# Patient Record
Sex: FEMALE | Race: WHITE | Employment: UNEMPLOYED | URBAN - METROPOLITAN AREA
[De-identification: names, ages, dates, MRNs, and addresses within clinical notes are randomized per-mention and may not be internally consistent; named-entity substitution may affect disease eponyms.]

---

## 2017-09-15 PROBLEM — Z22.330 GBS CARRIER: Status: ACTIVE | Noted: 2017-09-15

## 2017-09-15 PROBLEM — Z3A.41 41 WEEKS GESTATION OF PREGNANCY: Status: ACTIVE | Noted: 2017-09-15

## 2017-09-15 PROBLEM — O48.0 41 WEEKS GESTATION OF PREGNANCY: Status: ACTIVE | Noted: 2017-09-15

## 2017-09-19 ENCOUNTER — HOSPITAL ENCOUNTER (INPATIENT)
Age: 22
LOS: 3 days | Discharge: HOME | End: 2017-09-22
Attending: OBSTETRICS & GYNECOLOGY | Admitting: OBSTETRICS & GYNECOLOGY
Payer: COMMERCIAL

## 2017-09-19 PROBLEM — O48.0 POST TERM PREGNANCY AT 41 WEEKS GESTATION: Status: ACTIVE | Noted: 2017-09-19

## 2017-09-19 PROBLEM — O99.013 ANEMIA DURING PREGNANCY IN THIRD TRIMESTER: Status: ACTIVE | Noted: 2017-09-19

## 2017-09-19 PROBLEM — Z3A.41 POST TERM PREGNANCY AT 41 WEEKS GESTATION: Status: ACTIVE | Noted: 2017-09-19

## 2017-09-19 LAB
ABO + RH BLD: NORMAL
BASOPHILS # BLD: 0 K/UL (ref 0–0.06)
BASOPHILS NFR BLD: 0 % (ref 0–2)
BLOOD GROUP ANTIBODIES SERPL: NORMAL
DIFFERENTIAL METHOD BLD: ABNORMAL
EOSINOPHIL # BLD: 0.1 K/UL (ref 0–0.4)
EOSINOPHIL NFR BLD: 1 % (ref 0–5)
ERYTHROCYTE [DISTWIDTH] IN BLOOD BY AUTOMATED COUNT: 14.8 % (ref 11.6–14.5)
HCT VFR BLD AUTO: 30.2 % (ref 35–45)
HGB BLD-MCNC: 9.9 G/DL (ref 12–16)
LYMPHOCYTES # BLD: 1.8 K/UL (ref 0.9–3.6)
LYMPHOCYTES NFR BLD: 21 % (ref 21–52)
MCH RBC QN AUTO: 29.2 PG (ref 24–34)
MCHC RBC AUTO-ENTMCNC: 32.8 G/DL (ref 31–37)
MCV RBC AUTO: 89.1 FL (ref 74–97)
MONOCYTES # BLD: 0.6 K/UL (ref 0.05–1.2)
MONOCYTES NFR BLD: 7 % (ref 3–10)
NEUTS SEG # BLD: 6.1 K/UL (ref 1.8–8)
NEUTS SEG NFR BLD: 71 % (ref 40–73)
PLATELET # BLD AUTO: 172 K/UL (ref 135–420)
PMV BLD AUTO: 10.6 FL (ref 9.2–11.8)
RBC # BLD AUTO: 3.39 M/UL (ref 4.2–5.3)
SPECIMEN EXP DATE BLD: NORMAL
WBC # BLD AUTO: 8.6 K/UL (ref 4.6–13.2)

## 2017-09-19 PROCEDURE — 3E033VJ INTRODUCTION OF OTHER HORMONE INTO PERIPHERAL VEIN, PERCUTANEOUS APPROACH: ICD-10-PCS | Performed by: OBSTETRICS & GYNECOLOGY

## 2017-09-19 PROCEDURE — 86900 BLOOD TYPING SEROLOGIC ABO: CPT | Performed by: OBSTETRICS & GYNECOLOGY

## 2017-09-19 PROCEDURE — 36415 COLL VENOUS BLD VENIPUNCTURE: CPT | Performed by: OBSTETRICS & GYNECOLOGY

## 2017-09-19 PROCEDURE — 74011250637 HC RX REV CODE- 250/637: Performed by: OBSTETRICS & GYNECOLOGY

## 2017-09-19 PROCEDURE — 74011250636 HC RX REV CODE- 250/636

## 2017-09-19 PROCEDURE — 74011250636 HC RX REV CODE- 250/636: Performed by: OBSTETRICS & GYNECOLOGY

## 2017-09-19 PROCEDURE — 4A0HXCZ MEASUREMENT OF PRODUCTS OF CONCEPTION, CARDIAC RATE, EXTERNAL APPROACH: ICD-10-PCS | Performed by: OBSTETRICS & GYNECOLOGY

## 2017-09-19 PROCEDURE — 85025 COMPLETE CBC W/AUTO DIFF WBC: CPT | Performed by: OBSTETRICS & GYNECOLOGY

## 2017-09-19 PROCEDURE — 75410000002 HC LABOR FEE PER 1 HR

## 2017-09-19 PROCEDURE — 74011000258 HC RX REV CODE- 258

## 2017-09-19 PROCEDURE — 10907ZC DRAINAGE OF AMNIOTIC FLUID, THERAPEUTIC FROM PRODUCTS OF CONCEPTION, VIA NATURAL OR ARTIFICIAL OPENING: ICD-10-PCS | Performed by: OBSTETRICS & GYNECOLOGY

## 2017-09-19 PROCEDURE — 65270000029 HC RM PRIVATE

## 2017-09-19 RX ORDER — SODIUM CHLORIDE 900 MG/100ML
INJECTION INTRAVENOUS
Status: COMPLETED
Start: 2017-09-19 | End: 2017-09-19

## 2017-09-19 RX ORDER — TERBUTALINE SULFATE 1 MG/ML
0.25 INJECTION SUBCUTANEOUS
Status: DISCONTINUED | OUTPATIENT
Start: 2017-09-19 | End: 2017-09-21 | Stop reason: HOSPADM

## 2017-09-19 RX ORDER — NALBUPHINE HYDROCHLORIDE 10 MG/ML
10 INJECTION, SOLUTION INTRAMUSCULAR; INTRAVENOUS; SUBCUTANEOUS
Status: DISCONTINUED | OUTPATIENT
Start: 2017-09-19 | End: 2017-09-21 | Stop reason: HOSPADM

## 2017-09-19 RX ORDER — OXYTOCIN IN 5 % DEXTROSE 30/500 ML
1-25 PLASTIC BAG, INJECTION (ML) INTRAVENOUS
Status: DISCONTINUED | OUTPATIENT
Start: 2017-09-19 | End: 2017-09-21 | Stop reason: HOSPADM

## 2017-09-19 RX ORDER — PENICILLIN G POTASSIUM 5000000 [IU]/1
2.5 INJECTION, POWDER, FOR SOLUTION INTRAMUSCULAR; INTRAVENOUS EVERY 4 HOURS
Status: DISCONTINUED | OUTPATIENT
Start: 2017-09-19 | End: 2017-09-21 | Stop reason: HOSPADM

## 2017-09-19 RX ORDER — ACETAMINOPHEN 325 MG/1
650 TABLET ORAL
Status: DISCONTINUED | OUTPATIENT
Start: 2017-09-19 | End: 2017-09-22 | Stop reason: HOSPADM

## 2017-09-19 RX ORDER — PENICILLIN G POTASSIUM 5000000 [IU]/1
5 INJECTION, POWDER, FOR SOLUTION INTRAMUSCULAR; INTRAVENOUS EVERY 4 HOURS
Status: DISCONTINUED | OUTPATIENT
Start: 2017-09-19 | End: 2017-09-19

## 2017-09-19 RX ORDER — PENICILLIN G POTASSIUM 5000000 [IU]/1
2.5 INJECTION, POWDER, FOR SOLUTION INTRAMUSCULAR; INTRAVENOUS EVERY 4 HOURS
Status: DISCONTINUED | OUTPATIENT
Start: 2017-09-19 | End: 2017-09-19 | Stop reason: SDUPTHER

## 2017-09-19 RX ORDER — BUTORPHANOL TARTRATE 2 MG/ML
2 INJECTION INTRAMUSCULAR; INTRAVENOUS
Status: DISCONTINUED | OUTPATIENT
Start: 2017-09-19 | End: 2017-09-21 | Stop reason: HOSPADM

## 2017-09-19 RX ORDER — OXYTOCIN/RINGER'S LACTATE 20/1000 ML
500 PLASTIC BAG, INJECTION (ML) INTRAVENOUS ONCE
Status: ACTIVE | OUTPATIENT
Start: 2017-09-19 | End: 2017-09-19

## 2017-09-19 RX ORDER — MINERAL OIL
30 OIL (ML) ORAL AS NEEDED
Status: COMPLETED | OUTPATIENT
Start: 2017-09-19 | End: 2017-09-20

## 2017-09-19 RX ORDER — ZOLPIDEM TARTRATE 5 MG/1
10 TABLET ORAL
Status: DISCONTINUED | OUTPATIENT
Start: 2017-09-19 | End: 2017-09-22 | Stop reason: HOSPADM

## 2017-09-19 RX ORDER — LIDOCAINE HYDROCHLORIDE 10 MG/ML
20 INJECTION, SOLUTION EPIDURAL; INFILTRATION; INTRACAUDAL; PERINEURAL AS NEEDED
Status: DISCONTINUED | OUTPATIENT
Start: 2017-09-19 | End: 2017-09-21 | Stop reason: HOSPADM

## 2017-09-19 RX ORDER — SODIUM CHLORIDE, SODIUM LACTATE, POTASSIUM CHLORIDE, CALCIUM CHLORIDE 600; 310; 30; 20 MG/100ML; MG/100ML; MG/100ML; MG/100ML
125 INJECTION, SOLUTION INTRAVENOUS CONTINUOUS
Status: DISCONTINUED | OUTPATIENT
Start: 2017-09-19 | End: 2017-09-21 | Stop reason: HOSPADM

## 2017-09-19 RX ORDER — OXYTOCIN/RINGER'S LACTATE 20/1000 ML
125 PLASTIC BAG, INJECTION (ML) INTRAVENOUS CONTINUOUS
Status: DISCONTINUED | OUTPATIENT
Start: 2017-09-19 | End: 2017-09-21 | Stop reason: HOSPADM

## 2017-09-19 RX ORDER — METHYLERGONOVINE MALEATE 0.2 MG/ML
0.2 INJECTION INTRAVENOUS AS NEEDED
Status: DISCONTINUED | OUTPATIENT
Start: 2017-09-19 | End: 2017-09-21 | Stop reason: HOSPADM

## 2017-09-19 RX ORDER — HYDROMORPHONE HYDROCHLORIDE 2 MG/ML
1 INJECTION, SOLUTION INTRAMUSCULAR; INTRAVENOUS; SUBCUTANEOUS
Status: DISCONTINUED | OUTPATIENT
Start: 2017-09-19 | End: 2017-09-21 | Stop reason: HOSPADM

## 2017-09-19 RX ADMIN — PENICILLIN G POTASSIUM 2.5 MILLION UNITS: 20000000 POWDER, FOR SOLUTION INTRAVENOUS at 19:39

## 2017-09-19 RX ADMIN — PENICILLIN G POTASSIUM 2.5 MILLION UNITS: 20000000 POWDER, FOR SOLUTION INTRAVENOUS at 13:19

## 2017-09-19 RX ADMIN — PENICILLIN G POTASSIUM 5 MILLION UNITS: 5000000 POWDER, FOR SOLUTION INTRAMUSCULAR; INTRAPLEURAL; INTRATHECAL; INTRAVENOUS at 08:20

## 2017-09-19 RX ADMIN — ACETAMINOPHEN 650 MG: 325 TABLET ORAL at 13:31

## 2017-09-19 RX ADMIN — SODIUM CHLORIDE, SODIUM LACTATE, POTASSIUM CHLORIDE, AND CALCIUM CHLORIDE 125 ML/HR: 600; 310; 30; 20 INJECTION, SOLUTION INTRAVENOUS at 18:13

## 2017-09-19 RX ADMIN — Medication 2 MILLI-UNITS/MIN: at 08:16

## 2017-09-19 RX ADMIN — Medication 20 MILLI-UNITS/MIN: at 15:02

## 2017-09-19 RX ADMIN — SODIUM CHLORIDE 100 ML: 900 INJECTION INTRAVENOUS at 08:20

## 2017-09-19 NOTE — PROGRESS NOTES
0800 Consents signed. Head to toe assessment complete. Patient resting comfortably in high fowlers with right pelvic hip tilt. Denies needs. 925 [facility] Dr. Jose Hebert at bedside. SVE 1/80/-2.; discussing plan for the day. 1025 Patient OOB to void   Patient placed on birthing ball. EFM/TOCO adjusted. Denies needs. [address] on birthing ball with family at bedside. Denies needs. 1154 Patient OOB to void w/o difficulty. 1205 Back to bed. Positioned left lateral with peanut ball. EFM/TOCO adjusted. Denies needs. 1250 Patient complaints of dull anterior headache and requests medicine to alleviate. Vitals WNL; denies headache, blurry vision. 2729 [facility] [address] [facility] [address] Dr. Jose Hebert with quick return call. Orders received for PO Tylenol 650 mg q 4-6 prn.  1333 Patient OOB to void. 1339 Back to bed; high gli's with heels touch. EFM/TOCO adjusted. 1414 Patient OOB to void. 1420 Patient positioned hands and knees. 1503 Patient high-gil's with heels together; EFM/TOCO adjusted. Denies needs. 1613 Patient OOB to void.

## 2017-09-19 NOTE — PROGRESS NOTES
2979 - Patient taken to BR 6, gowned and assisted to bed. Oriented to room, call light within reach. 0700 - EFM tested, positive FM.     0705 - MARY ELLEN Carter RN assumed care.

## 2017-09-19 NOTE — IP AVS SNAPSHOT
[facility] 
 
 
 [address] [facility] [address] 
[phone] Patient: Hannah Alex MRN: [id number] :1995 Current Discharge Medication List  
  
START taking these medications Dose & Instructions Dispensing Information Comments Morning Noon Evening Bedtime  
 ibuprofen 800 mg tablet Commonly known as:  MOTRIN Your last dose was: Your next dose is:    
   
   
 Dose:  800 mg Take 1 Tab by mouth every eight (8) hours as needed. Indications: Pain Quantity:  40 Tab Refills:  1 CONTINUE these medications which have NOT CHANGED Dose & Instructions Dispensing Information Comments Morning Noon Evening Bedtime PNV66-Iron Fumarate-FA-DSS-DHA 26-1.2- mg Cap Your last dose was: Your next dose is: Take  by mouth. Refills:  0 Where to Get Your Medications These medications were sent to [facility] [place] - [address]  [address], [place] [address] Hours:  M-F 930am-6pm Phone:  [phone]  
  ibuprofen 800 mg tablet

## 2017-09-19 NOTE — H&P
History & Physical    Name: Murali Tierney MRN: [id number]  SSN: [id number]    YOB: 1995  Age: 20 y.o. Sex: female      Subjective:     Estimated Date of Delivery: 17  OB History    Para Term  AB Living   2 0   1 0   SAB TAB Ectopic Molar Multiple Live Births    1    0      # Outcome Date GA Lbr Bryan/2nd Weight Sex Delivery Anes PTL Lv   2 Current            1 TAB                   Ms. Bassam Montelongo is admitted with pregnancy at [address] [place] [address] for induction of labor due to poor fetal growth and post dates. Prenatal course was complicated by SGA fetus. Please see prenatal records for details. Last EFW 6 lb. 15 oz. 10%. Patient denies HA, swelling, RUQ pain. No past medical history on file. Past Surgical History:   Procedure Laterality Date    HX WISDOM TEETH EXTRACTION       Social History     Occupational History    Not on file. Social History Main Topics    Smoking status: Former Smoker     Packs/day: 0.25    Smokeless tobacco: Never Used    Alcohol use No    Drug use: No    Sexual activity: Yes     Family History   Problem Relation Age of Onset    Diabetes Mother     Heart Disease Father     Hypertension Father        No Known Allergies  Prior to Admission medications    Medication Sig Start Date End Date Taking? Authorizing Provider   PNV66-Iron Fumarate-FA-DSS-DHA 26-1.2- mg cap Take  by mouth. Historical Provider        Review of Systems: Pertinent items are noted in the History of Present Illness. Objective:     Vitals:  Vitals:    17 0801 17 0831 17 0901 17 0931   BP: 119/79 111/80 106/73 108/71   Pulse: 81 84 83 86   Resp: 18      Temp: 98.4 °F (36.9 °C)      Weight:   200 lb (90.7 kg)    Height:   5' 3\" (1.6 m)         Physical Exam:  Patient without distress.   Abdomen:  Gravid, vertex, EFW 7 lbs  Pelvic:  1/80/-2/mid/soft  Membranes:  Intact  Fetal Heart Rate: Reactive          Prenatal Labs:   Lab Results   Component Value Date/Time ABO/Rh(D) O POSITIVE 09/19/2017 07:30 AM    Rubella, External 2.28 02/03/2017    HBsAg, External NON-REACTIVE 02/03/2017    HIV, External NON-REACTIVE 02/03/2017    RPR, External NON- REACTIVE 02/03/2017    Gonorrhea, External NEGATIVE 02/03/2017    Chlamydia, External NEGATIVE 02/03/2017    GrBStrep, External positive 08/16/2017       Impression/Plan:     Post dates. SGA.  +GBS carrier. Plan: Admit for induction of labor. Group B Strep positive, will treat prophylactically with penicillin.     Signed By:  Linda Navarro MD     September 19, 2017

## 2017-09-19 NOTE — PROGRESS NOTES
0710 Bedside and Verbal shift change report given to MARY ELLEN Cook,Sadaf and TU Woods RN (oncoming nurse) by HUSSAIN Jeff Rn (offgoing nurse). Report included the following information SBAR, Kardex, Procedure Summary, Intake/Output, MAR and Recent Results. 1224 Pt assisted to right lateral side with peanut ball between legs. [place] readjusted. 26 Dr. Kitty Lugo called for update. Orders received to check SVE 2-3/80/-2. Orders received to turn of Pitocin at 1830. Restart Pitocin at 0400 tomorrow morning. Orders for regular diet tonight and NPO after midnight. Pt may have Ambien for sleep. 1810 Pitocin stopped. 2010 Bedside and Verbal shift change report given to PIERCE Philippe RN (oncoming nurse) by Jackie Garcia (offgoing nurse). Report included the following information SBAR, Kardex, Procedure Summary, Intake/Output, MAR and Recent Results.

## 2017-09-19 NOTE — IP AVS SNAPSHOT
Summary of Care Report The Summary of Care report has been created to help improve care coordination. Users with access to [employer] or [address] (Web-based application) may access additional patient information including the Discharge Summary. If you are not currently a [address] user and need more information, please call the number listed below in the [place] [address] section and ask to be connected with Medical Records. Facility Information Name Address Phone [address] [place] [id number] [phone] Patient Information Patient Name Sex SAMARA Prajapati ([id number]) Female 1995 Discharge Information Admitting Provider Service Area Unit  
 Rhiannon Mcgregor MD / [phone] [address] / [phone] Discharge Provider Discharge Date/Time Discharge Disposition Destination (none) 2017 Morning (Pending) AHR (none) Patient Language Language ENGLISH [13] Hospital Problems as of 2017  Reviewed: 2017  9:21 AM by Rhiannon Mcgregor MD  
  
  
  
 Class Noted - Resolved Last Modified POA Active Problems Spontaneous vaginal delivery  2017 - Present 2017 by Rhiannon Mcgregor MD No  
  Entered by Rhiannon Mcgregor MD  
  Perineal laceration, second degree, delivered  2017 - Present 2017 by Rhiannon Mcgregor MD No  
  Entered by Rhiannon Mcgregor MD  
  
Non-Hospital Problems as of 2017  Reviewed: 2017  9:21 AM by Rhiannon Mcgregor MD  
 None You are allergic to the following No active allergies Current Discharge Medication List  
  
START taking these medications Dose & Instructions Dispensing Information Comments  
 ibuprofen 800 mg tablet Commonly known as:  MOTRIN  Dose:  800 mg  
 Take 1 Tab by mouth every eight (8) hours as needed. Indications: Pain Quantity:  40 Tab Refills:  1 CONTINUE these medications which have NOT CHANGED Dose & Instructions Dispensing Information Comments PNV66-Iron Fumarate-FA-DSS-DHA 26-1.2- mg Cap Take  by mouth. Refills:  0 Current Immunizations Name Date Influenza Vaccine 2/3/2017 Tdap 2017 Surgery Information ID Date/Time Status Primary Surgeon All Procedures Location [id number] 2017 Posted   [facility] Follow-up Information Follow up With Details Comments Contact Info None   None (395) Patient stated that they have no PCP Discharge Instructions Patient given copies of Post Birth Warning signs and Post Birth discharge instructions. Help after discharge pamphlet given as well. [employer] Activation Thank you for requesting access to [employer]. Please follow the instructions below to securely access and download your online medical record. [employer] allows you to send messages to your doctor, view your test results, renew your prescriptions, schedule appointments, and more. How Do I Sign Up? 1. In your internet browser, go to [email or web address]. 2. Click on the First Time User? Click Here link in the Sign In box. You will see the New Member Sign Up page. 3. Enter your [employer] Access Code exactly as it appears below. You will not need to use this code after youve completed the sign-up process. If you do not sign up before the expiration date, you must request a new code. [employer] Access Code: [email or web address] Expires: 2017  7:33 AM (This is the date your [employer] access code will ) 4. Enter the last four digits of your Social Security Number (xxxx) and Date of Birth (mm/dd/yyyy) as indicated and click Submit. You will be taken to the next sign-up page. 5. Create a CatchFreet ID. This will be your [employer] login ID and cannot be changed, so think of one that is secure and easy to remember. 6. Create a [employer] password. You can change your password at any time. 7. Enter your Password Reset Question and Answer. This can be used at a later time if you forget your password. 8. Enter your e-mail address. You will receive e-mail notification when new information is available in [address] 9. Click Sign Up. You can now view and download portions of your medical record. 10. Click the Download Summary menu link to download a portable copy of your medical information. Additional Information If you have questions, please visit the Frequently Asked Questions section of the [employer] website at [email or web address] Remember, [employer] is NOT to be used for urgent needs. For medical emergencies, dial 911. Patient armband removed and given to patient to take home. Patient was informed of the privacy risks if armband lost or stolenRecognize signs and symptoms of STROKE: 
 
F - Face looks uneven. A - Arms unable to move or move evenly. S - Speech slurred or non existent. T - Time - call 911 as soon as signs and symptoms begin - do not go back to bed or wait to see if you get better - TIME IS BRAIN. Chart Review Routing History No Routing History on File

## 2017-09-19 NOTE — IP AVS SNAPSHOT
[facility] 
 
 
 [address] [place] [id number] 
[phone] Patient: Spencer Hitchcock MRN: [id number] :1995 You are allergic to the following No active allergies Recent Documentation Height Weight Breastfeeding? BMI OB Status Smoking Status 1.6 m 90.7 kg Unknown 35.43 kg/m2 Recent pregnancy Former Smoker Emergency Contacts Name Discharge Info Relation Home Work Mobile Shan Aceves DISCHARGE CAREGIVER [3] Boyfriend [17]  [phone] About your hospitalization You were admitted on:  2017 You last received care in the:  [address] [place] [address] You were discharged on:  2017 Unit phone number:  [phone] Why you were hospitalized Your primary diagnosis was:  41 Weeks Gestation Of Pregnancy Your diagnoses also included:  Gbs Carrier, Post Term Pregnancy At 39 Weeks Gestation, Sga (Small For Gestational Age), Anemia During Pregnancy In Third Trimester, Spontaneous Vaginal Delivery, Perineal Laceration, Second Degree, Delivered Providers Seen During Your Hospitalizations Provider Role Specialty Primary office phone Linda Navarro MD Attending Provider Obstetrics & Gynecology [phone] Your Primary Care Physician (PCP) Primary Care Physician Office Phone Office Fax NONE ** None ** ** None ** Follow-up Information Follow up With Details Comments Contact Info None   None (395) Patient stated that they have no PCP Current Discharge Medication List  
  
START taking these medications Dose & Instructions Dispensing Information Comments Morning Noon Evening Bedtime  
 ibuprofen 800 mg tablet Commonly known as:  MOTRIN Your last dose was: Your next dose is:    
   
   
 Dose:  800 mg Take 1 Tab by mouth every eight (8) hours as needed. Indications: Pain Quantity:  40 Tab Refills:  1 CONTINUE these medications which have NOT CHANGED Dose & Instructions Dispensing Information Comments Morning Noon Evening Bedtime PNV66-Iron Fumarate-FA-DSS-DHA 26-1.2- mg Cap Your last dose was: Your next dose is: Take  by mouth. Refills:  0 Where to Get Your Medications [facility] [place] [address]  [address], [place] [address] Hours:  M-F 930am-6pm Phone:  [phone]  
  ibuprofen 800 mg tablet Discharge Instructions Patient given copies of Post Birth Warning signs and Post Birth discharge instructions. Help after discharge pamphlet given as well. [employer] Activation Thank you for requesting access to [employer]. Please follow the instructions below to securely access and download your online medical record. [employer] allows you to send messages to your doctor, view your test results, renew your prescriptions, schedule appointments, and more. How Do I Sign Up? 1. In your internet browser, go to [email or web address]. 2. Click on the First Time User? Click Here link in the Sign In box. You will see the New Member Sign Up page. 3. Enter your [employer] Access Code exactly as it appears below. You will not need to use this code after youve completed the sign-up process. If you do not sign up before the expiration date, you must request a new code. [employer] Access Code: [email or web address] Expires: 2017  7:33 AM (This is the date your [employer] access code will ) 4. Enter the last four digits of your Social Security Number (xxxx) and Date of Birth (mm/dd/yyyy) as indicated and click Submit. You will be taken to the next sign-up page. 5. Create a [employer] ID.  This will be your [employer] login ID and cannot be changed, so think of one that is secure and easy to remember. 6. Create a [employer] password. You can change your password at any time. 7. Enter your Password Reset Question and Answer. This can be used at a later time if you forget your password. 8. Enter your e-mail address. You will receive e-mail notification when new information is available in [address] 9. Click Sign Up. You can now view and download portions of your medical record. 10. Click the Download Summary menu link to download a portable copy of your medical information. Additional Information If you have questions, please visit the Frequently Asked Questions section of the [employer] website at [email or web address] Remember, [employer] is NOT to be used for urgent needs. For medical emergencies, dial 911. Patient armband removed and given to patient to take home. Patient was informed of the privacy risks if armband lost or stolenRecognize signs and symptoms of STROKE: 
 
F - Face looks uneven. A - Arms unable to move or move evenly. S - Speech slurred or non existent. T - Time - call 911 as soon as signs and symptoms begin - do not go back to bed or wait to see if you get better - TIME IS BRAIN. Discharge Orders None [employer] Announcement We are excited to announce that we are making your provider's discharge notes available to you in [employer]. You will see these notes when they are completed and signed by the physician that discharged you from your recent hospital stay. If you have any questions or concerns about any information you see in [employer], please call the Health Information Department where you were seen or reach out to your Primary Care Provider for more information about your plan of care. Introducing [facility]! David Nelson introduces [employer] patient portal. Now you can access parts of your medical record, email your doctor's office, and request medication refills online. 1. In your internet browser, go to [email or web address] 2. Click on the First Time User? Click Here link in the Sign In box. You will see the New Member Sign Up page. 3. Enter your [employer] Access Code exactly as it appears below. You will not need to use this code after youve completed the sign-up process. If you do not sign up before the expiration date, you must request a new code. · [employer] Access Code: [email or web address] Expires: 12/21/2017  7:33 AM 
 
4. Enter the last four digits of your Social Security Number (xxxx) and Date of Birth (mm/dd/yyyy) as indicated and click Submit. You will be taken to the next sign-up page. 5. Create a [employer] ID. This will be your [employer] login ID and cannot be changed, so think of one that is secure and easy to remember. 6. Create a [employer] password. You can change your password at any time. 7. Enter your Password Reset Question and Answer. This can be used at a later time if you forget your password. 8. Enter your e-mail address. You will receive e-mail notification when new information is available in [address] 9. Click Sign Up. You can now view and download portions of your medical record. 10. Click the Download Summary menu link to download a portable copy of your medical information. If you have questions, please visit the Frequently Asked Questions section of the [employer] website. Remember, [employer] is NOT to be used for urgent needs. For medical emergencies, dial 911. Now available from your iPhone and Android! General Information Please provide this summary of care documentation to your next provider. Patient Signature:  ____________________________________________________________ Date:  ____________________________________________________________  
  
Kishor Doll Provider Signature:  ____________________________________________________________ Date:  ____________________________________________________________

## 2017-09-20 ENCOUNTER — ANESTHESIA (OUTPATIENT)
Dept: LABOR AND DELIVERY | Age: 22
End: 2017-09-20
Payer: COMMERCIAL

## 2017-09-20 ENCOUNTER — ANESTHESIA EVENT (OUTPATIENT)
Dept: LABOR AND DELIVERY | Age: 22
End: 2017-09-20
Payer: COMMERCIAL

## 2017-09-20 PROCEDURE — 0KQM0ZZ REPAIR PERINEUM MUSCLE, OPEN APPROACH: ICD-10-PCS | Performed by: OBSTETRICS & GYNECOLOGY

## 2017-09-20 PROCEDURE — 74011250636 HC RX REV CODE- 250/636: Performed by: ANESTHESIOLOGY

## 2017-09-20 PROCEDURE — 74011250636 HC RX REV CODE- 250/636: Performed by: OBSTETRICS & GYNECOLOGY

## 2017-09-20 PROCEDURE — 75410000003 HC RECOV DEL/VAG/CSECN EA 0.5 HR

## 2017-09-20 PROCEDURE — 00HU33Z INSERTION OF INFUSION DEVICE INTO SPINAL CANAL, PERCUTANEOUS APPROACH: ICD-10-PCS | Performed by: ANESTHESIOLOGY

## 2017-09-20 PROCEDURE — 76060000078 HC EPIDURAL ANESTHESIA

## 2017-09-20 PROCEDURE — 77030007879 HC KT SPN EPDRL TELE -B: Performed by: ANESTHESIOLOGY

## 2017-09-20 PROCEDURE — 75410000002 HC LABOR FEE PER 1 HR

## 2017-09-20 PROCEDURE — 74011250637 HC RX REV CODE- 250/637: Performed by: OBSTETRICS & GYNECOLOGY

## 2017-09-20 PROCEDURE — 74011250636 HC RX REV CODE- 250/636

## 2017-09-20 PROCEDURE — 75410000000 HC DELIVERY VAGINAL/SINGLE

## 2017-09-20 PROCEDURE — 77030034849

## 2017-09-20 PROCEDURE — 3E0R3CZ INTRODUCE REGIONAL ANESTH IN SPINAL CANAL, PERC: ICD-10-PCS | Performed by: ANESTHESIOLOGY

## 2017-09-20 PROCEDURE — 65270000029 HC RM PRIVATE

## 2017-09-20 RX ORDER — LIDOCAINE HYDROCHLORIDE 10 MG/ML
INJECTION INFILTRATION; PERINEURAL
Status: DISPENSED
Start: 2017-09-20 | End: 2017-09-21

## 2017-09-20 RX ORDER — NALBUPHINE HYDROCHLORIDE 10 MG/ML
2.5 INJECTION, SOLUTION INTRAMUSCULAR; INTRAVENOUS; SUBCUTANEOUS
Status: DISCONTINUED | OUTPATIENT
Start: 2017-09-20 | End: 2017-09-21 | Stop reason: HOSPADM

## 2017-09-20 RX ORDER — FENTANYL CITRATE 50 UG/ML
INJECTION, SOLUTION INTRAMUSCULAR; INTRAVENOUS
Status: DISPENSED
Start: 2017-09-20 | End: 2017-09-21

## 2017-09-20 RX ORDER — DIPHENHYDRAMINE HYDROCHLORIDE 50 MG/ML
25 INJECTION, SOLUTION INTRAMUSCULAR; INTRAVENOUS
Status: DISCONTINUED | OUTPATIENT
Start: 2017-09-20 | End: 2017-09-21 | Stop reason: HOSPADM

## 2017-09-20 RX ORDER — FENTANYL/ROPIVACAINE/NS/PF 2MCG/ML-.1
PLASTIC BAG, INJECTION (ML) EPIDURAL
Status: COMPLETED
Start: 2017-09-20 | End: 2017-09-20

## 2017-09-20 RX ORDER — OXYTOCIN/RINGER'S LACTATE 20/1000 ML
PLASTIC BAG, INJECTION (ML) INTRAVENOUS
Status: DISPENSED
Start: 2017-09-20 | End: 2017-09-21

## 2017-09-20 RX ORDER — NALOXONE HYDROCHLORIDE 0.4 MG/ML
0.2 INJECTION, SOLUTION INTRAMUSCULAR; INTRAVENOUS; SUBCUTANEOUS AS NEEDED
Status: DISCONTINUED | OUTPATIENT
Start: 2017-09-20 | End: 2017-09-21 | Stop reason: HOSPADM

## 2017-09-20 RX ORDER — FENTANYL CITRATE 50 UG/ML
100 INJECTION, SOLUTION INTRAMUSCULAR; INTRAVENOUS ONCE
Status: DISCONTINUED | OUTPATIENT
Start: 2017-09-20 | End: 2017-09-21 | Stop reason: HOSPADM

## 2017-09-20 RX ORDER — SODIUM CHLORIDE 0.9 % (FLUSH) 0.9 %
5-10 SYRINGE (ML) INJECTION EVERY 8 HOURS
Status: DISCONTINUED | OUTPATIENT
Start: 2017-09-20 | End: 2017-09-21 | Stop reason: HOSPADM

## 2017-09-20 RX ORDER — FENTANYL CITRATE 50 UG/ML
INJECTION, SOLUTION INTRAMUSCULAR; INTRAVENOUS AS NEEDED
Status: DISCONTINUED | OUTPATIENT
Start: 2017-09-20 | End: 2017-09-21 | Stop reason: HOSPADM

## 2017-09-20 RX ORDER — LIDOCAINE HYDROCHLORIDE AND EPINEPHRINE 20; 5 MG/ML; UG/ML
INJECTION, SOLUTION EPIDURAL; INFILTRATION; INTRACAUDAL; PERINEURAL
Status: DISPENSED
Start: 2017-09-20 | End: 2017-09-21

## 2017-09-20 RX ORDER — FENTANYL/ROPIVACAINE/NS/PF 2MCG/ML-.1
1-15 PLASTIC BAG, INJECTION (ML) EPIDURAL
Status: DISCONTINUED | OUTPATIENT
Start: 2017-09-20 | End: 2017-09-21 | Stop reason: HOSPADM

## 2017-09-20 RX ORDER — SODIUM CHLORIDE 0.9 % (FLUSH) 0.9 %
5-10 SYRINGE (ML) INJECTION AS NEEDED
Status: DISCONTINUED | OUTPATIENT
Start: 2017-09-20 | End: 2017-09-21 | Stop reason: HOSPADM

## 2017-09-20 RX ADMIN — Medication 12 ML/HR: at 13:10

## 2017-09-20 RX ADMIN — PENICILLIN G POTASSIUM 2.5 MILLION UNITS: 20000000 POWDER, FOR SOLUTION INTRAVENOUS at 16:37

## 2017-09-20 RX ADMIN — BUTORPHANOL TARTRATE 2 MG: 2 INJECTION, SOLUTION INTRAMUSCULAR; INTRAVENOUS at 10:39

## 2017-09-20 RX ADMIN — Medication 500 MILLI-UNITS/MIN: at 22:05

## 2017-09-20 RX ADMIN — SODIUM CHLORIDE, SODIUM LACTATE, POTASSIUM CHLORIDE, AND CALCIUM CHLORIDE 125 ML/HR: 600; 310; 30; 20 INJECTION, SOLUTION INTRAVENOUS at 08:36

## 2017-09-20 RX ADMIN — SODIUM CHLORIDE, SODIUM LACTATE, POTASSIUM CHLORIDE, AND CALCIUM CHLORIDE 125 ML/HR: 600; 310; 30; 20 INJECTION, SOLUTION INTRAVENOUS at 13:40

## 2017-09-20 RX ADMIN — Medication 125 ML/HR: at 22:35

## 2017-09-20 RX ADMIN — Medication 30 ML: at 21:30

## 2017-09-20 RX ADMIN — PENICILLIN G POTASSIUM 2.5 MILLION UNITS: 20000000 POWDER, FOR SOLUTION INTRAVENOUS at 12:41

## 2017-09-20 RX ADMIN — FENTANYL CITRATE 100 MCG: 50 INJECTION, SOLUTION INTRAMUSCULAR; INTRAVENOUS at 12:42

## 2017-09-20 RX ADMIN — PENICILLIN G POTASSIUM 2.5 MILLION UNITS: 5000000 POWDER, FOR SOLUTION INTRAMUSCULAR; INTRAPLEURAL; INTRATHECAL; INTRAVENOUS at 09:00

## 2017-09-20 RX ADMIN — PENICILLIN G POTASSIUM 2.5 MILLION UNITS: 20000000 POWDER, FOR SOLUTION INTRAVENOUS at 04:21

## 2017-09-20 RX ADMIN — Medication 2 MILLI-UNITS/MIN: at 05:15

## 2017-09-20 RX ADMIN — Medication 12 ML/HR: at 18:19

## 2017-09-20 NOTE — PROGRESS NOTES
Pelvic exam:  [id number], Effaced:100%Station:0 fhr with mod variability, some variables with ctx, will proceed with second stage

## 2017-09-20 NOTE — ROUTINE PROCESS
1230:  Patient states she is trying to decide about going ahead and getting her epidural and request SVE. SVE 3/100/-1--2. Fluid bolus     1630:  SVE 7-8/100/0. Patient repositioned left lateral with peanut ball between knees.

## 2017-09-20 NOTE — ANESTHESIA PROCEDURE NOTES
Epidural Block    Performed by: Jaja Garcia  Authorized by: Jaja Garcia     Pre-Procedure  Indication: labor epidural    Preanesthetic Checklist: risks and benefits discussed and timeout performed      Epidural:   Patient position:  Seated  Prep region:  Lumbar  Prep: Chlorhexidine    Location:  L3-4    Needle and Epidural Catheter:   Needle Type:  Tuohy  Needle Gauge:  17 G  Injection Technique:  Loss of resistance using saline  Attempts:  1  Catheter Size:  20 G  Catheter at Skin Depth (cm):  8  Depth in Epidural Space (cm):  2  Events: no blood with aspiration, no cerebrospinal fluid with aspiration, no paresthesia and negative aspiration test    Test Dose:  Negative    Assessment:   Catheter Secured:  Tegaderm and tape  Insertion:  Uncomplicated  Patient tolerance:  Patient tolerated the procedure well with no immediate complications  Ropiv 4.7% 13 mls with 100 mcg fentanyl injected following negative aspiration.

## 2017-09-20 NOTE — PROGRESS NOTES
5228 report received from HUSSAIN Hebert at bedside  3239 AROM, meconium noted  1945 SVE 2-3/100/-2  1240 Dr. Anabela Espinoza at bedside for epidural procedure  1242 time out  1247 catheter  1249 Loading dose  1249 Fentanyl vial handed to Dr. Anabela Espinoza  1250 Connected to pump  1345 Dr. Jose Bacon called for patient update, informed of most recent SVE, pitocin, and that patient has epidural. No new orders at this time.    18 Dr. Ramos Steel paged via [facility] most recent [facility] Report given to Michael Herbert

## 2017-09-20 NOTE — ANESTHESIA PREPROCEDURE EVALUATION
Anesthetic History   No history of anesthetic complications            Review of Systems / Medical History  Patient summary reviewed, nursing notes reviewed and pertinent labs reviewed    Pulmonary  Within defined limits                 Neuro/Psych   Within defined limits           Cardiovascular  Within defined limits                Exercise tolerance: >4 METS     GI/Hepatic/Renal  Within defined limits              Endo/Other        Obesity     Other Findings              Physical Exam    Airway  Mallampati: II  TM Distance: 4 - 6 cm  Neck ROM: normal range of motion   Mouth opening: Normal     Cardiovascular    Rhythm: regular  Rate: normal         Dental  No notable dental hx       Pulmonary  Breath sounds clear to auscultation               Abdominal  GI exam deferred       Other Findings            Anesthetic Plan    ASA: 3  Anesthesia type: epidural            Anesthetic plan and risks discussed with: Patient

## 2017-09-20 NOTE — ROUTINE PROCESS
Bedside and verbal shift change report given to Ashli Carlton RN (oncoming nurse) by C. Adah Kawasaki RN (offgoing nurse). Report included the following information SBAR, Kardex and MAR.

## 2017-09-20 NOTE — PROGRESS NOTES
2030-Assessment complete at this time. Pt . Pt denies SOB, headache, visual disturbances and pain.      2150-Pt up to [address] pt off monitor until pitocin is started at 6am    0100-0500-System downtime    0500-Pitocin started at 2mL/hr

## 2017-09-20 NOTE — PROGRESS NOTES
Labor Progress Note  Patient seen, fetal heart rate and contraction pattern evaluated, patient examined. Patient not really feeling any contractions. No data found. Physical Exam:  Cervical Exam:  2-3/100/-2 cm dilated    Membranes:  Artificial Rupture of Membranes; Amniotic Fluid: medium amount of particulate meconium fluid  Uterine Activity: infrequent  Fetal Heart Rate: Reactive  Pitocin at 8 mu    Assessment/Plan:  Continue plan for vaginal delivery.

## 2017-09-21 PROBLEM — Z3A.41 POST TERM PREGNANCY AT 41 WEEKS GESTATION: Status: RESOLVED | Noted: 2017-09-19 | Resolved: 2017-09-21

## 2017-09-21 PROBLEM — Z3A.41 41 WEEKS GESTATION OF PREGNANCY: Status: RESOLVED | Noted: 2017-09-15 | Resolved: 2017-09-21

## 2017-09-21 PROBLEM — Z22.330 GBS CARRIER: Status: RESOLVED | Noted: 2017-09-15 | Resolved: 2017-09-21

## 2017-09-21 PROBLEM — O99.013 ANEMIA DURING PREGNANCY IN THIRD TRIMESTER: Status: RESOLVED | Noted: 2017-09-19 | Resolved: 2017-09-21

## 2017-09-21 PROBLEM — O48.0 POST TERM PREGNANCY AT 41 WEEKS GESTATION: Status: RESOLVED | Noted: 2017-09-19 | Resolved: 2017-09-21

## 2017-09-21 PROBLEM — O48.0 41 WEEKS GESTATION OF PREGNANCY: Status: RESOLVED | Noted: 2017-09-15 | Resolved: 2017-09-21

## 2017-09-21 LAB
HCT VFR BLD AUTO: 27.4 % (ref 35–45)
HGB BLD-MCNC: 8.9 G/DL (ref 12–16)

## 2017-09-21 PROCEDURE — 65270000029 HC RM PRIVATE

## 2017-09-21 PROCEDURE — 36415 COLL VENOUS BLD VENIPUNCTURE: CPT | Performed by: OBSTETRICS & GYNECOLOGY

## 2017-09-21 PROCEDURE — 85018 HEMOGLOBIN: CPT | Performed by: OBSTETRICS & GYNECOLOGY

## 2017-09-21 PROCEDURE — 74011250637 HC RX REV CODE- 250/637: Performed by: OBSTETRICS & GYNECOLOGY

## 2017-09-21 PROCEDURE — 85014 HEMATOCRIT: CPT | Performed by: OBSTETRICS & GYNECOLOGY

## 2017-09-21 PROCEDURE — 88307 TISSUE EXAM BY PATHOLOGIST: CPT | Performed by: OBSTETRICS & GYNECOLOGY

## 2017-09-21 RX ORDER — ACETAMINOPHEN 325 MG/1
650 TABLET ORAL
Status: DISCONTINUED | OUTPATIENT
Start: 2017-09-21 | End: 2017-09-22 | Stop reason: HOSPADM

## 2017-09-21 RX ORDER — PROMETHAZINE HYDROCHLORIDE 25 MG/ML
25 INJECTION, SOLUTION INTRAMUSCULAR; INTRAVENOUS
Status: DISCONTINUED | OUTPATIENT
Start: 2017-09-21 | End: 2017-09-22 | Stop reason: HOSPADM

## 2017-09-21 RX ORDER — AMOXICILLIN 250 MG
1 CAPSULE ORAL
Status: DISCONTINUED | OUTPATIENT
Start: 2017-09-21 | End: 2017-09-22 | Stop reason: HOSPADM

## 2017-09-21 RX ORDER — OXYCODONE AND ACETAMINOPHEN 5; 325 MG/1; MG/1
2 TABLET ORAL
Status: DISCONTINUED | OUTPATIENT
Start: 2017-09-21 | End: 2017-09-22 | Stop reason: HOSPADM

## 2017-09-21 RX ORDER — ZOLPIDEM TARTRATE 5 MG/1
5 TABLET ORAL
Status: DISCONTINUED | OUTPATIENT
Start: 2017-09-21 | End: 2017-09-22 | Stop reason: HOSPADM

## 2017-09-21 RX ORDER — IBUPROFEN 400 MG/1
800 TABLET ORAL
Status: DISCONTINUED | OUTPATIENT
Start: 2017-09-21 | End: 2017-09-22 | Stop reason: HOSPADM

## 2017-09-21 RX ADMIN — IBUPROFEN 800 MG: 400 TABLET, FILM COATED ORAL at 12:29

## 2017-09-21 RX ADMIN — OXYCODONE HYDROCHLORIDE AND ACETAMINOPHEN 2 TABLET: 5; 325 TABLET ORAL at 05:16

## 2017-09-21 RX ADMIN — IBUPROFEN 800 MG: 400 TABLET, FILM COATED ORAL at 01:18

## 2017-09-21 RX ADMIN — OXYCODONE HYDROCHLORIDE AND ACETAMINOPHEN 2 TABLET: 5; 325 TABLET ORAL at 18:10

## 2017-09-21 RX ADMIN — OXYCODONE HYDROCHLORIDE AND ACETAMINOPHEN 2 TABLET: 5; 325 TABLET ORAL at 21:56

## 2017-09-21 RX ADMIN — ACETAMINOPHEN 650 MG: 325 TABLET ORAL at 00:07

## 2017-09-21 RX ADMIN — OXYCODONE HYDROCHLORIDE AND ACETAMINOPHEN 2 TABLET: 5; 325 TABLET ORAL at 13:38

## 2017-09-21 RX ADMIN — OXYCODONE HYDROCHLORIDE AND ACETAMINOPHEN 2 TABLET: 5; 325 TABLET ORAL at 09:25

## 2017-09-21 NOTE — PROGRESS NOTES
Bedside and verbal report received from Gracie Cortes RN via [employer], [employer], and [facility]. Assumed care of pt at this time. Pt resting in bed comfortably with family at bedside. Pt is complete. Dr. Juliette Gamboa called and informed of pt sve and is on her way into hospital.  [address] reviewed with pt and family. All verbalized understanding. Will continue to monitor pt.     : Head to toe assessment performed. : Dr. Juliette Gamboa called unit and told this RN to begin pushing with pt    : Ace removed. : Pt legs in sterrips. Beginning to push at this time    : Dr. Juliette Gamboa at bedside for delivery    :  of viable baby girl at this time. Tactile stimulation and bulb suction performed. Cord cut and clamped by Dr. Juliette Gamboa. Baby immediately taken to radiant warmer for further evaluation by [facility].    :  of placenta. Pitocin infusing. 5: Pericare performed. Pad and ice pack placed on perineum. Pt legs out of sterrips. Bed put back together. 2245: Pt sitting up in bed breastfeeding baby at this time. 2340: Epidural catheter removed. Cath tip intact. 2350: Pt up to bathroom to void. 200ml voided. Pericare performed. Pad and ice pack changed. New gown donned. 0007: 650 mg tylenol given sera 4/10 abdominal and vaginal pain. 0015: Pt transferred to postpartum room 243 via wheelchair in stable condition with family at side. 1188: Bedside and verbal report given to TU Carlisle RN via [employer], [employer], and [facility]. Care relinquished at this time.

## 2017-09-21 NOTE — ANESTHESIA POSTPROCEDURE EVALUATION
9/21/2017  11:02 AM    Laboring Epidural Follow-up Note     Referring physician: Oswaldo Kelly, *   Patient status post vaginal delivery with labor epidural    Visit Vitals    /77 (BP 1 Location: Right arm, BP Patient Position: At rest)    Pulse 83    Temp 36.4 °C (97.5 °F)    Resp 16    Ht 5' 3\" (1.6 m)    Wt 90.7 kg (200 lb)    LMP 12/03/2016    SpO2 98%    Breastfeeding Unknown    BMI 35.43 kg/m2       Epidural removed by L&D staff  No sedation, pruritis noted. Adequate analgesia.   No obvious anesthesia complications          Carolynn Plascencia, DEMETRIUS

## 2017-09-21 NOTE — PROGRESS NOTES
Post-Partum Day Number 1 Progress Note    [place]     Assessment:   Hospital Problems  Date Reviewed: 2017          Codes Class Noted POA    Post term pregnancy at 39 weeks gestation ICD-10-CM: O48.0, Z3A.41  ICD-9-CM: 645.10  2017 Unknown        SGA (small for gestational age) ICD-8-CM: P36.80  ICD-9-CM: 764.00  2017 Yes        Anemia during pregnancy in third trimester ICD-10-CM: O99.013  ICD-9-CM: 648.23  2017 Yes        * (Principal)41 weeks gestation of pregnancy ICD-10-CM: Z3A.41  ICD-9-CM: 645.10  9/15/2017 Yes        GBS carrier ICD-10-CM: Z22.330  ICD-9-CM: V02.51  9/15/2017 Yes            Doing well, post partum day 1    Plan:  1. Continue routine postpartum and perineal care as well as maternal education. 2. Plan for discharge tomorrow evening. Information for the patient's :  Ashely Cher [[id number]]   Vaginal, Spontaneous Delivery   Patient doing well without significant complaint. Voiding without difficulty, normal lochia. Would like to go home tomorrow evening. Current Facility-Administered Medications   Medication Dose Route Frequency       Vitals:  Visit Vitals    /77 (BP 1 Location: Right arm, BP Patient Position: At rest)    Pulse 83    Temp 97.5 °F (36.4 °C)    Resp 16    Ht 5' 3\" (1.6 m)    Wt 200 lb (90.7 kg)    LMP 2016    SpO2 98%    Breastfeeding Unknown    BMI 35.43 kg/m2     Temp (24hrs), Av.6 °F (37 °C), Min:97.5 °F (36.4 °C), Max:99.2 °F (37.3 °C)        Exam:   Patient without distress. Abdomen soft, fundus firm, nontender                Perineum with normal lochia noted. Lower extremities are negative for swelling, cords or tenderness.     Labs:     Lab Results   Component Value Date/Time    WBC 8.6 2017 07:30 AM    HGB 8.9 2017 02:38 AM    HGB 9.9 2017 07:30 AM    HCT 27.4 2017 02:38 AM    HCT 30.2 2017 07:30 AM    PLATELET 203  07:30 AM    Hgb, External 12.1 02/03/2017    Hct, External 35.3 02/03/2017    Platelet cnt., External 175 02/03/2017       Recent Results (from the past 24 hour(s))   HEMATOCRIT    Collection Time: 09/21/17  2:38 AM   Result Value Ref Range    HCT 27.4 (L) 35.0 - 45.0 %   HEMOGLOBIN    Collection Time: 09/21/17  2:38 AM   Result Value Ref Range    HGB 8.9 (L) 12.0 - 16.0 g/dL

## 2017-09-21 NOTE — PROGRESS NOTES
Bedside shift change report given to HUSSAIN Paris RN (oncoming nurse) by Ross Chowdhury. Arabella Del Rio RN (offgoing nurse). Report included the following information SBAR, Procedure Summary, Intake/Output, MAR and Recent Results.

## 2017-09-21 NOTE — PROGRESS NOTES
Bedside and Verbal shift change report given to Kristopher Seay RN (oncoming nurse) by Becki Casarez RN   (offgoing nurse). Report given with SBAR and Kardex.

## 2017-09-21 NOTE — LACTATION NOTE
Per mom, infant latching and nursing well. Breastfeeding basics and log sheet discussed. Will page for feeds. 2646 Infant latched and nursing well.

## 2017-09-21 NOTE — PROGRESS NOTES
Delivery Note    Obstetrician: jo    Assistant: none    Pre-Delivery Diagnosis: Term pregnancy    Post-Delivery Diagnosis: Female    Intrapartum Event: Meconium    Procedure: Spontaneous vaginal delivery    Epidural: YES    Monitor:  Fetal Heart Tones - Internal and Uterine Contractions - External    Indications for instrumental delivery: none    Estimated Blood Loss:     Episiotomy: none    Laceration(s):  2nd degree    Laceration(s) repair: YES repaired with 2-0 vicryl    Presentation: Cephalic    Fetal Description: little    Fetal Position: Occiput Anterior    Birth Weight:     Birth Length:     Apgar - One Minute: 7    Apgar - Five Minutes: 9    Umbilical Cord: 3 vessels present    Specimens: placenta           Complications:  none           Cord Blood Results:   Information for the patient's :  Dusty Crawley [[id number]]   No results found for: PCTABR, ABORH, PCTDIG, BILI, ABORH, ABORHEXT    Prenatal Labs:     Lab Results   Component Value Date/Time    ABO/Rh(D) O POSITIVE 2017 07:30 AM    HBsAg, External NON-REACTIVE 2017    HIV, External NON-REACTIVE 2017    Rubella, External 2.28 2017    RPR, External NON- REACTIVE 2017    Gonorrhea, External NEGATIVE 2017    Chlamydia, External NEGATIVE 2017    GrBStrep, External positive 2017        Attending Attestation: I was present and scrubbed for the entire procedure peds in attendance for delivery    Signed By:  Manuel Holley MD     2017

## 2017-09-22 VITALS
RESPIRATION RATE: 18 BRPM | OXYGEN SATURATION: 98 % | TEMPERATURE: 97.9 F | BODY MASS INDEX: 35.44 KG/M2 | SYSTOLIC BLOOD PRESSURE: 120 MMHG | DIASTOLIC BLOOD PRESSURE: 73 MMHG | WEIGHT: 200 LBS | HEART RATE: 73 BPM | HEIGHT: 63 IN

## 2017-09-22 PROCEDURE — 74011250637 HC RX REV CODE- 250/637: Performed by: OBSTETRICS & GYNECOLOGY

## 2017-09-22 RX ORDER — IBUPROFEN 800 MG/1
800 TABLET ORAL
Qty: 40 TAB | Refills: 1 | Status: SHIPPED | OUTPATIENT
Start: 2017-09-22

## 2017-09-22 RX ADMIN — OXYCODONE HYDROCHLORIDE AND ACETAMINOPHEN 2 TABLET: 5; 325 TABLET ORAL at 08:36

## 2017-09-22 RX ADMIN — OXYCODONE HYDROCHLORIDE AND ACETAMINOPHEN 2 TABLET: 5; 325 TABLET ORAL at 02:22

## 2017-09-22 NOTE — PROGRESS NOTES
Post-Partum Day Number 2 Progress Note    [place]     Assessment:   Hospital Problems  Date Reviewed: 2017          Codes Class Noted POA    Spontaneous vaginal delivery ICD-10-CM: O80  ICD-9-CM: 486  2017 No        Perineal laceration, second degree, delivered ICD-10-CM: O70.1  ICD-9-CM: 664.11  2017 No            Doing well, post partum day 2    Plan:   1. Discharge home today  2. Follow up in office in 6 weeks with Ashley Agosto MD   3. Post partum activity advised, diet as tolerated  4. Discharge Medications: ibuprofen,  and medications prior to admission    Information for the patient's :  Karie Bravo [[id number]]   Vaginal, Spontaneous Delivery   Patient doing well without significant complaint. Voiding without difficulty, normal lochia. Having some back pain and overall soreness. Current Facility-Administered Medications   Medication Dose Route Frequency       Vitals:  Visit Vitals    /73 (BP 1 Location: Right arm, BP Patient Position: At rest)    Pulse 73    Temp 97.9 °F (36.6 °C)    Resp 18    Ht 5' 3\" (1.6 m)    Wt 200 lb (90.7 kg)    LMP 2016    SpO2 98%    Breastfeeding Unknown    BMI 35.43 kg/m2     Temp (24hrs), Av.8 °F (36.6 °C), Min:97.7 °F (36.5 °C), Max:97.9 °F (36.6 °C)      Exam:         Patient without distress. Abdomen soft, fundus firm, nontender                 Lower extremities are negative for swelling, cords or tenderness. Labs:     Lab Results   Component Value Date/Time    WBC 8.6 2017 07:30 AM    HGB 8.9 2017 02:38 AM    HGB 9.9 2017 07:30 AM    HCT 27.4 2017 02:38 AM    HCT 30.2 2017 07:30 AM    PLATELET 056  07:30 AM    Hgb, External 12.1 2017    Hct, External 35.3 2017    Platelet cnt., External 175 2017       No results found for this or any previous visit (from the past 24 hour(s)).

## 2017-09-22 NOTE — PROGRESS NOTES
Patient was visited by [facility] volunteer Iliana Upton. Volunteer conducted a Spiritual Care Screening and reported no needs to this . Baby [place] Card and Spiritual Care literature were provided. Chaplains will continue to follow and will provide pastoral care as needed or requested. [address], M.Div.   Board Certified   [phone] - Office

## 2017-09-22 NOTE — PROGRESS NOTES
Discharge education completed. Pt verbalized understanding of diet, activity, incisional care, personal care, s/s of infection, and importance of f/u care. Pt denied any additional questions. Bedside shift change report given to HUSSAIN Paris RN (oncoming nurse) by Ramo Garciaer KAYLEE (offgoing nurse).  Report included the following information SBAR, Procedure Summary, Intake/Output, MAR and Recent Results.

## 2017-09-22 NOTE — DISCHARGE INSTRUCTIONS
Patient given copies of Post Birth Warning signs and Post Birth discharge instructions. Help after discharge pamphlet given as well. MyChart Activation    Thank you for requesting access to [employer]. Please follow the instructions below to securely access and download your online medical record. [employer] allows you to send messages to your doctor, view your test results, renew your prescriptions, schedule appointments, and more. How Do I Sign Up? 1. In your internet browser, go to [email or web address]. 2. Click on the First Time User? Click Here link in the Sign In box. You will see the New Member Sign Up page. 3. Enter your [employer] Access Code exactly as it appears below. You will not need to use this code after youve completed the sign-up process. If you do not sign up before the expiration date, you must request a new code. [employer] Access Code: [email or web address]  Expires: 2017  7:33 AM (This is the date your [employer] access code will )    4. Enter the last four digits of your Social Security Number (xxxx) and Date of Birth (mm/dd/yyyy) as indicated and click Submit. You will be taken to the next sign-up page. 5. Create a [employer] ID. This will be your [employer] login ID and cannot be changed, so think of one that is secure and easy to remember. 6. Create a [employer] password. You can change your password at any time. 7. Enter your Password Reset Question and Answer. This can be used at a later time if you forget your password. 8. Enter your e-mail address. You will receive e-mail notification when new information is available in [address] 9. Click Sign Up. You can now view and download portions of your medical record. 10. Click the Download Summary menu link to download a portable copy of your medical information.     Additional Information    If you have questions, please visit the Frequently Asked Questions section of the [employer] website at [email or web address] Remember, MyChart is NOT to be used for urgent needs. For medical emergencies, dial 911. Patient armband removed and given to patient to take home. Patient was informed of the privacy risks if armband lost or stolenRecognize signs and symptoms of STROKE:    F - Face looks uneven. A - Arms unable to move or move evenly. S - Speech slurred or non existent. T - Time - call 911 as soon as signs and symptoms begin - do not go back to bed or wait to see if you get better - TIME IS BRAIN.

## 2017-09-22 NOTE — LACTATION NOTE
Last e feeds have been bottles. Reviewed supply/demand and nipple preference. Infant fussing now-offered help with BF. Mom wants to wait until later.   Encouraged to page [address] if wishes to pursue BF.

## 2017-09-22 NOTE — DISCHARGE SUMMARY
Obstetrical Discharge Summary     Name: Rosana Lorenz MRN: [id number]  SSN: [id number]    YOB: 1995  Age: 20 y.o. Sex: female      Admit Date: 2017    Discharge Date: 2017    Admitting Physician: Robby Crowley MD     Attending Physician:  Robby Crowley MD     Discharge Diagnoses:   Information for the patient's :  Logan Breech [[id number]]   Delivery of a 7 lb 9.1 oz (3.434 kg) female infant via Vaginal, Spontaneous Delivery on 2017 at 9:57 PM  by . Apgars were 7 and 9. Additional Diagnoses:   Problem List as of 2017  Date Reviewed: 2017          Codes Class Noted - Resolved    Spontaneous vaginal delivery ICD-10-CM: O80  ICD-9-CM: 650  2017 - Present        Perineal laceration, second degree, delivered ICD-10-CM: O70.1  ICD-9-CM: 664.11  2017 - Present        RESOLVED: Post term pregnancy at 39 weeks gestation ICD-10-CM: O48.0, Z3A.41  ICD-9-CM: 645.10  2017 - 2017        RESOLVED: SGA (small for gestational age) ICD-8-CM: P36.80  ICD-9-CM: 764.00  2017 - 2017        RESOLVED: Anemia during pregnancy in third trimester ICD-10-CM: O99.013  ICD-9-CM: 648.23  2017 - 2017        * (Principal)RESOLVED: 41 weeks gestation of pregnancy ICD-10-CM: Z3A.41  ICD-9-CM: 645.10  9/15/2017 - 2017        RESOLVED: GBS carrier ICD-10-CM: Z22.330  ICD-9-CM: V02.51  9/15/2017 - 2017              Lab Results   Component Value Date/Time    Rubella, External 2.28 2017    GrBStrep, External positive 2017     Recent Labs      17   0238   HGB  8.9*       Hospital Course: Normal hospital course following the delivery. Patient Instructions:   Current Discharge Medication List      START taking these medications    Details   ibuprofen (MOTRIN) 800 mg tablet Take 1 Tab by mouth every eight (8) hours as needed.  Indications: Pain  Qty: 40 Tab, Refills: 1         CONTINUE these medications which have NOT CHANGED Details   PNV66-Iron Fumarate-FA-DSS-DHA 26-1.2- mg cap Take  by mouth. Reference my discharge instructions.     Follow-up Appointments   Procedures    FOLLOW UP VISIT Appointment in: 6 Weeks     Standing Status:   Standing     Number of Occurrences:   1     Order Specific Question:   Appointment in     Answer:   6 Weeks        Signed By:  Criselda West MD     September 22, 2017                       BST

## 2017-09-22 NOTE — PROGRESS NOTES
Discharge instructions reviewed, copies given. Questions answered. E-sign done and prescriptions given. Patient will be discharged home with baby.